# Patient Record
Sex: FEMALE | Race: WHITE | URBAN - METROPOLITAN AREA
[De-identification: names, ages, dates, MRNs, and addresses within clinical notes are randomized per-mention and may not be internally consistent; named-entity substitution may affect disease eponyms.]

---

## 2017-04-06 ENCOUNTER — OFFICE VISIT (OUTPATIENT)
Dept: FAMILY MEDICINE CLINIC | Age: 27
End: 2017-04-06

## 2017-04-06 VITALS
OXYGEN SATURATION: 100 % | RESPIRATION RATE: 18 BRPM | HEART RATE: 77 BPM | BODY MASS INDEX: 21.79 KG/M2 | SYSTOLIC BLOOD PRESSURE: 108 MMHG | WEIGHT: 123 LBS | HEIGHT: 63 IN | TEMPERATURE: 97.6 F | DIASTOLIC BLOOD PRESSURE: 68 MMHG

## 2017-04-06 DIAGNOSIS — J01.11 ACUTE RECURRENT FRONTAL SINUSITIS: ICD-10-CM

## 2017-04-06 DIAGNOSIS — Z88.9 H/O SEASONAL ALLERGIES: Primary | ICD-10-CM

## 2017-04-06 RX ORDER — AZITHROMYCIN 250 MG/1
TABLET, FILM COATED ORAL
Qty: 6 TAB | Refills: 0 | Status: SHIPPED | OUTPATIENT
Start: 2017-04-06 | End: 2017-04-11

## 2017-04-06 RX ORDER — FLUTICASONE PROPIONATE 50 MCG
SPRAY, SUSPENSION (ML) NASAL
Qty: 1 BOTTLE | Refills: 3 | Status: SHIPPED | OUTPATIENT
Start: 2017-04-06

## 2017-04-06 NOTE — PROGRESS NOTES
Chief Complaint   Patient presents with    Allergies     seasonal     .. 1. Have you been to the ER, urgent care clinic since your last visit? Hospitalized since your last visit? Yes Reason for visit: pap    2. Have you seen or consulted any other health care providers outside of the [address] [place] [address] since your last visit? Include any pap smears or colon screening.  Yes Where: obgyn

## 2017-04-06 NOTE — PROGRESS NOTES
HISTORY OF PRESENT ILLNESS  Sudeep Viveros is a 27 y.o. female. HPI Comments: Patient mentions she has been having seasonal allergies for the last month and she has tried Claritin , allegra , and zyrtec. She has been having nasal congestion when she waked up and postnasal drip. She mentions generally she take Claritin and she is feeling better however this time it has not happened. I will treat her with antibiotics and Flonase. Allergies   The history is provided by the patient. This is a recurrent problem. Episode onset: for one month. The problem occurs constantly. The problem has been gradually worsening. Associated symptoms include headaches. Pertinent negatives include no chest pain, no abdominal pain and no shortness of breath. Exacerbated by: allergies. Nothing relieves the symptoms. She has tried nothing for the symptoms. Review of Systems   Constitutional: Negative for chills, fever and malaise/fatigue. HENT: Positive for congestion, ear pain and sore throat. Eyes: Negative for blurred vision, double vision, pain and discharge. Respiratory: Positive for cough. Negative for sputum production, shortness of breath and wheezing. Cardiovascular: Negative for chest pain, palpitations and leg swelling. Gastrointestinal: Negative for abdominal pain, constipation, diarrhea, nausea and vomiting. Musculoskeletal: Negative for joint pain. Neurological: Positive for headaches. Negative for dizziness, tingling and weakness. Endo/Heme/Allergies: Positive for environmental allergies. Psychiatric/Behavioral: The patient is not nervous/anxious. Visit Vitals    /68 (BP 1 Location: Left arm, BP Patient Position: Sitting)    Pulse 77    Temp 97.6 °F (36.4 °C) (Oral)    Resp 18    Ht 5' 3\" (1.6 m)    Wt 123 lb (55.8 kg)    SpO2 100%    BMI 21.79 kg/m2       Physical Exam   Constitutional: She is oriented to person, place, and time. She appears well-developed and well-nourished. No distress. HENT:   Head: Normocephalic and atraumatic. Nose: Right sinus exhibits maxillary sinus tenderness and frontal sinus tenderness. Left sinus exhibits maxillary sinus tenderness and frontal sinus tenderness. Mouth/Throat: Posterior oropharyngeal erythema present. Eyes: EOM are normal. Pupils are equal, round, and reactive to light. No scleral icterus. Neck: Normal range of motion. No thyromegaly present. Cardiovascular: Normal rate, regular rhythm and normal heart sounds. Pulmonary/Chest: Effort normal and breath sounds normal. No respiratory distress. She has no wheezes. Abdominal: Soft. Bowel sounds are normal. She exhibits no distension. There is no tenderness. Lymphadenopathy:     She has no cervical adenopathy. Neurological: She is alert and oriented to person, place, and time. Psychiatric: She has a normal mood and affect. ASSESSMENT and PLAN  Sinusitis :  1) Ok to take tylenol / motrin to relieve pain. 2) Please finish course of antibiotics , explained side effects and patient verbalizes understanding. 3) Mucolytic's such as guaifenesin which can thin out the mucous. 4) Use nasal steroid inhaler and anti-allergy medication. 5) Can use nasal saline spray or Neti-pot. 6) Please keep a humidifier in your bedroom. 7) Patient instructions and information on diagnosis to be handed out.

## 2017-07-28 ENCOUNTER — OFFICE VISIT (OUTPATIENT)
Dept: FAMILY MEDICINE CLINIC | Age: 27
End: 2017-07-28

## 2017-07-28 VITALS
SYSTOLIC BLOOD PRESSURE: 134 MMHG | WEIGHT: 124.2 LBS | RESPIRATION RATE: 17 BRPM | DIASTOLIC BLOOD PRESSURE: 92 MMHG | TEMPERATURE: 98 F | BODY MASS INDEX: 22.01 KG/M2 | OXYGEN SATURATION: 99 % | HEIGHT: 63 IN | HEART RATE: 74 BPM

## 2017-07-28 DIAGNOSIS — D22.9 ATYPICAL MOLE: Primary | ICD-10-CM

## 2017-07-28 RX ORDER — LEVOTHYROXINE SODIUM 25 UG/1
TABLET ORAL
COMMUNITY

## 2017-07-28 NOTE — PROGRESS NOTES
Satya Patricio is a 27 y.o. female presents to office for mole removal. Hx of mole removal.      1. Have you been to the ER, urgent care clinic or hospitalized since your last visit? no        Health Maintenance items with a due date reviewed with patient:  Health Maintenance Due   Topic Date Due    DTaP/Tdap/Td series (1 - Tdap) 12/12/2009    PAP AKA CERVICAL CYTOLOGY  10/10/2017

## 2017-07-28 NOTE — PROGRESS NOTES
HISTORY OF PRESENT ILLNESS  Fredericka Claude is a 27 y.o. female. HPI Comments: Patient is here in regards to a mole she has on her right arm. She would like it removed and I have discussed that as she had an appointment to see a dermatologist in 2 weeks to have it removed by them. The mole is in her skin and is discolored. She will discuss this with her dermatologist.  She mentions she has a concern in regard to the moles in her back and arms as she used to tan daily about ten years  Back but has not been at present and she has  Gotten most of her moles removed. At present she mentions she does have some on the back which her dermatologist monitors and they have not changed. She does use sunscreen and wears protective clothing. She is also going through infertility work up and she will sign a release of information. I have asked patient to come back for a physical exam as soon as she gets through her current upcoming appointments with ob/gyn and she does mention she has been started on levothyroxine by ob/gyn as her thyroid was a little upper limit. Mole   The history is provided by the patient. This is a chronic problem. The problem occurs constantly. Progression since onset: gradually changing. Pertinent negatives include no chest pain, no abdominal pain, no headaches and no shortness of breath. Associated symptoms comments: Mole on right arm which has changed color. . Nothing aggravates the symptoms. Nothing relieves the symptoms. She has tried nothing for the symptoms. Review of Systems   Constitutional: Negative for chills, fever and malaise/fatigue. HENT: Negative for hearing loss, nosebleeds and sore throat. Eyes: Negative for blurred vision, double vision, pain and discharge. Respiratory: Negative for cough, sputum production, shortness of breath and wheezing. Cardiovascular: Negative for chest pain, palpitations and leg swelling.    Gastrointestinal: Negative for abdominal pain, blood in stool, nausea and vomiting. Genitourinary: Negative for dysuria, frequency and hematuria. Musculoskeletal: Negative for joint pain and myalgias. Skin:        Mole on right arm with some changes as per patient. Neurological: Negative for dizziness, tingling, speech change, focal weakness, weakness and headaches. Psychiatric/Behavioral: The patient is not nervous/anxious. Visit Vitals    BP (!) 134/92 (BP 1 Location: Left arm, BP Patient Position: Sitting)    Pulse 74    Temp 98 °F (36.7 °C) (Oral)    Resp 17    Ht 5' 3\" (1.6 m)    Wt 124 lb 3.2 oz (56.3 kg)    SpO2 99%    BMI 22 kg/m2       Physical Exam   Constitutional: She is oriented to person, place, and time. She appears well-developed and well-nourished. No distress. HENT:   Head: Normocephalic and atraumatic. Right Ear: External ear normal.   Left Ear: External ear normal.   Mouth/Throat: Oropharynx is clear and moist. No oropharyngeal exudate. Eyes: EOM are normal. Pupils are equal, round, and reactive to light. No scleral icterus. Neck: Normal range of motion. No thyromegaly present. Cardiovascular: Normal rate, regular rhythm and normal heart sounds. Pulmonary/Chest: Effort normal and breath sounds normal. No respiratory distress. She has no wheezes. Abdominal: Soft. Bowel sounds are normal. She exhibits no distension. There is no tenderness. Lymphadenopathy:     She has no cervical adenopathy. Neurological: She is alert and oriented to person, place, and time. Skin:        Small mole about half the size of a mukund which is slightly discolored and light brown and is symmetrical and is not raised. Psychiatric: She has a normal mood and affect.        ASSESSMENT and PLAN  Mole on right arm :  - Please make sure you keep appointment with dermatology in 2 weeks for removal  - Sign release for records from gyn

## 2018-01-23 ENCOUNTER — OFFICE VISIT (OUTPATIENT)
Dept: FAMILY MEDICINE CLINIC | Age: 28
End: 2018-01-23

## 2018-01-23 ENCOUNTER — TELEPHONE (OUTPATIENT)
Dept: FAMILY MEDICINE CLINIC | Age: 28
End: 2018-01-23

## 2018-01-23 VITALS
WEIGHT: 129 LBS | HEART RATE: 75 BPM | OXYGEN SATURATION: 100 % | RESPIRATION RATE: 16 BRPM | DIASTOLIC BLOOD PRESSURE: 73 MMHG | TEMPERATURE: 97.1 F | SYSTOLIC BLOOD PRESSURE: 110 MMHG | BODY MASS INDEX: 22.86 KG/M2 | HEIGHT: 63 IN

## 2018-01-23 DIAGNOSIS — F41.9 ANXIETY: Primary | ICD-10-CM

## 2018-01-23 RX ORDER — SERTRALINE HYDROCHLORIDE 25 MG/1
1 TABLET, FILM COATED ORAL DAILY
COMMUNITY
Start: 2017-12-18 | End: 2018-01-23 | Stop reason: SDUPTHER

## 2018-01-23 RX ORDER — SERTRALINE HYDROCHLORIDE 25 MG/1
25 TABLET, FILM COATED ORAL DAILY
Qty: 60 TAB | Refills: 3 | Status: SHIPPED | OUTPATIENT
Start: 2018-01-23 | End: 2018-01-23

## 2018-01-23 RX ORDER — ERGOCALCIFEROL 1.25 MG/1
1 CAPSULE ORAL DAILY
COMMUNITY
Start: 2018-01-15

## 2018-01-23 RX ORDER — LETROZOLE 2.5 MG/1
1 TABLET, FILM COATED ORAL DAILY
COMMUNITY
Start: 2018-01-17

## 2018-01-23 NOTE — PROGRESS NOTES
HISTORY OF PRESENT ILLNESS  Yohannes Nichols is a 32 y.o. female. HPI Comments: Patient is here today and she would like a refill on her zoloft which was previously initiated by her ob/gyn doctor whom she is no longer seeing. Patient mentions she was taking that medication due to her symptoms of anxiety and depression which she mentions runs in the family. Patient mentions at present she is trying to get pregnant and has been going through infertility work up and treatment. Currently she also sees a endocrinologist and gets her thyroid checked with them. She is actively trying to conceive at present and I have explained to her that Zoloft has some side effects she should be aware of if she decides to get pregnant. I did also explain the risk of pulmonary hypertension/ respiratory effects among newborns and that the side effects of the behavioral impact is also unknown with constant ssri  exposure. Patient got very upset and mentioned that \"my mother was on Zoloft when she was pregnant. \"  I did mention to her that I can understand her concern for her mental health but at the same time she is trying to get pregnant and for the safety of her and her child this may not be the best medication. Patient got upset and said\" well you can not change my mind. \" My intention was not to change her mind but to refer to a specialist who can help her. I never got the chance to discuss that with her due to her suddenly becoming upset. She got up from the examination table and mentioned you know what \" forget it , I get the medication from my endocrinologist he will fill it. \" I did not get a chance to examine the patient and I did apologize to her for not being able to assist her as I do not feel comfortable prescribing this medication when she is actively trying to become pregnant.  Also I did not get the opportunity to discuss any other treatment strategies or any other options with referral to a psychiatrist or for therapy due to patient being very navjot in regards to coming from a background of depression and anxiety. She was also very adamant on just getting her Zoloft refilled. The minute I explained my concern in regards to her medication she got very defensive and upset. Patient walked out of the office after making a phone call. I will not be billing for this visit. Review of Systems   Unable to perform ROS: Other   Psychiatric/Behavioral: Positive for depression. The patient is nervous/anxious. Physical Exam   Constitutional: She appears well-developed.    Appears to be anxious       ASSESSMENT and PLAN   Medication refill:  - Medication refill will not be done due to patient actively trying to get pregnant  - Has been advised on adverse reactions, patient got upset and left office

## 2018-01-23 NOTE — TELEPHONE ENCOUNTER
VO was given per  to cancel rx. Spoke with Soraida with [facility]. Soraida verbalized understanding of conversation.

## 2018-03-22 ENCOUNTER — TELEPHONE (OUTPATIENT)
Dept: FAMILY MEDICINE CLINIC | Age: 28
End: 2018-03-22

## 2018-03-22 NOTE — TELEPHONE ENCOUNTER
LVM for pt to return call in regard to scheduled appt with Dr. Kristen Nunez. Message asked for pt to return call to reschedule with her PCP Dr. Zoë Linares, that Dr. Kristen Nunez can not see pt for this visit.

## 2018-03-22 NOTE — TELEPHONE ENCOUNTER
LVM2 for pt to return call and reschedule tomorrow's appt, to be with PCP Dr. Frank Keller. For the 2nd time, was forwarded to .

## 2018-03-23 NOTE — TELEPHONE ENCOUNTER
LVM for pt to reschedule today's appt with her pcp Dr. Harsh Nogueira. Pt will need to discuss her last physical with her pcp not Dr. Clraa Yo.

## 2018-03-23 NOTE — TELEPHONE ENCOUNTER
Spoke to pt and explained the reason for the call. Pt stated she was trying to cancel the appointment with Dr. Brionna Jones anyway. PSR asked pt if she wanted to schedule appt with Dr. Melany Marquez to discuss concerns, pt stated no and disconnected call.

## 2018-04-03 NOTE — TELEPHONE ENCOUNTER
Patient did not show for appt. Several messages were left for patient to schedule with her provider . This encounter will be closed.